# Patient Record
Sex: MALE | Race: WHITE | NOT HISPANIC OR LATINO | Employment: FULL TIME | URBAN - METROPOLITAN AREA
[De-identification: names, ages, dates, MRNs, and addresses within clinical notes are randomized per-mention and may not be internally consistent; named-entity substitution may affect disease eponyms.]

---

## 2024-01-05 PROBLEM — M75.82 TENDINITIS OF LEFT ROTATOR CUFF: Status: ACTIVE | Noted: 2024-01-05

## 2024-01-05 PROBLEM — H61.23 BILATERAL IMPACTED CERUMEN: Status: ACTIVE | Noted: 2024-01-05

## 2024-01-05 PROBLEM — H92.13 OTORRHEA OF BOTH EARS: Status: ACTIVE | Noted: 2024-01-05

## 2024-01-05 PROBLEM — R63.4 WEIGHT LOSS: Status: ACTIVE | Noted: 2024-01-05

## 2024-01-05 PROBLEM — J20.9 ACUTE BRONCHITIS: Status: ACTIVE | Noted: 2024-01-05

## 2024-01-05 PROBLEM — H60.8X3 CHRONIC ECZEMATOUS OTITIS EXTERNA OF BOTH EARS: Status: ACTIVE | Noted: 2024-01-05

## 2024-01-05 PROBLEM — H90.3 BILATERAL HIGH FREQUENCY SENSORINEURAL HEARING LOSS: Status: ACTIVE | Noted: 2024-01-05

## 2024-01-05 PROBLEM — M54.16 LUMBAR RADICULOPATHY: Status: ACTIVE | Noted: 2024-01-05

## 2024-01-05 PROBLEM — R74.8 ELEVATED LIVER ENZYMES: Status: ACTIVE | Noted: 2024-01-05

## 2024-01-05 PROBLEM — K40.90 RIGHT INGUINAL HERNIA: Status: ACTIVE | Noted: 2024-01-05

## 2024-01-05 PROBLEM — T16.9XXA FOREIGN BODY IN EAR: Status: ACTIVE | Noted: 2024-01-05

## 2024-01-05 PROBLEM — R07.9 CHEST PAIN: Status: ACTIVE | Noted: 2024-01-05

## 2024-01-05 PROBLEM — M54.50 ACUTE LOW BACK PAIN: Status: ACTIVE | Noted: 2024-01-05

## 2024-01-05 RX ORDER — FLUOCINOLONE ACETONIDE 0.11 MG/ML
OIL AURICULAR (OTIC)
COMMUNITY
Start: 2021-11-15 | End: 2024-01-15 | Stop reason: ALTCHOICE

## 2024-01-05 RX ORDER — CLOTRIMAZOLE AND BETAMETHASONE DIPROPIONATE 10; .64 MG/G; MG/G
CREAM TOPICAL
COMMUNITY
Start: 2021-05-07 | End: 2024-01-15 | Stop reason: SDUPTHER

## 2024-01-05 RX ORDER — HYDROCORTISONE AND ACETIC ACID 20.75; 10.375 MG/ML; MG/ML
SOLUTION AURICULAR (OTIC)
COMMUNITY
Start: 2021-05-07

## 2024-01-12 NOTE — PROGRESS NOTES
Subjective   Patient ID: Favian Boykin is a 53 y.o. male who presents for Follow-up and Hearing Loss.  HPI  This 53-year-old male is being seen back in the office for a recheck of his head and neck and hearing.  He was treated previously for otitis media with effusion which cleared with medical management.  He does have a history of chronic eczematous changes to both ear canals.  He does have a history of occupational noise exposure and previous audiograms when no middle ear fluid was present was primarily positive for mild high-frequency hearing loss.  Has had difficulties with eczematous changes to both ear canals and had been on fluocinolone oil drops and Lotrisone ointment as treatment when needed.  Review of Systems  A 12 point ROS has been reviewed and are negative for complaint except what is stated in the history of present illness and/or past medical history as noted in the EMR  Objective   Physical Exam  EXAMINATION:    GENERAL HARRY.EARANCE: Alert, in no acute distress, normal pitch and clarity of voice, well-developed and nourished, cooperative.    HEAD/FACE: Normocephalic, atraumatic, normal facial movements and strength, no no tenderness to palpation, no lesions noted.    SKIN: Normal turgor, no raised or ulcerative lesions, warm and dry to palpation.    EYES: Extraocular motions intact, no nystagmus noted, pupils equal and reactive to light and accommodation, no conjunctivitis.    EARS: Both ears--external ear anatomy is normal without lesions, auditory canals are patent and without skin abrasions or lesions, cerumen partially obstructs both ear canals and is removed with a wax loop, hearing is intact to voice, tympanic membranes are intact with no acute inflammation, light reflexes present, no effusions are noted and no mastoid tenderness found to palpation.    NOSE:  No external skin lesions are noted, nares are patent, septum is intact with mild anterior deviation to the right   , dust and matter from  his work environment as noted on the mucous membrane surfaces, nasal turbinates are normal in appearance, sinuses are nontender to palpation bilaterally, no internal lesions or polyps are noted, no discharge is noted.     OROPHARYNX/ORAL CAVITY: Oropharynx is not inflamed and is without lesions, mucosa of the oral cavity is intact and without lesions, tongue is midline and mobile, no acute dental disease is noted, TMJs are mobile    LUNG-- NO wheezing or rhonchi normal respiratory effort    HEART-- No venous congestion,  rate and rhythm regular,    NECK: No lymphadenopathy is palpated, carotid pulses are intact, neck is supple with full range of motion, no thyroid abnormalities are noted, trachea is midline, no neck masses are palpated.    LYMPHATICS: No cervical adenopathy or supraclavicular adenopathy is palpated.    NEUROLOGIC/PSYCH; alert and oriented, cranial nerves are grossly intact, gait is without falling, no motor deficits are noted.    Audiogram today on the right ear revealed mild hearing loss in the upper frequencies borderline normal and remaining frequencies.  On the left-hand side there was a mixed hearing loss mild up through 4000 Hz moderate in the 2 upper frequencies of sound similar to his test from 2022.  Tympanogram revealed a normal pattern on the right type C pattern on the left.  His exam was negative for visible middle ear fluid.    Patient ID: Favian Boykin is a 53 y.o. male.    Ear cerumen removal    Date/Time: 1/15/2024 3:46 PM    Performed by: Abdulkadir Echevarria DMD, MD  Authorized by: Abdulkadir Echevarria DMD, MD    Consent:     Consent obtained:  Verbal    Consent given by:  Patient    Risks discussed:  Pain and incomplete removal    Alternatives discussed:  No treatment and alternative treatment  Universal protocol:     Procedure explained and questions answered to patient or proxy's satisfaction: yes      Imaging studies available: no      Required blood products, implants, devices, and  special equipment available: no      Patient identity confirmed:  Verbally with patient  Procedure details:     Location:  L ear and R ear    Procedure type: curette      Procedure type comment:  Or suction    Procedure outcomes: cerumen removed    Post-procedure details:     Inspection:  No bleeding and ear canal clear    Hearing quality:  Improved    Procedure completion:  Tolerated well, no immediate complications  Comments:        Procedure Ear Cleaning    Consent:  The planned procedure including the risks as well as alternatives of treatments were discussed and verbal consent obtained.    Procedure: Using otoscopic techniques, cerumen is removed with a wax loop from both ear canals.    Findings:  The external auditory canals are without inflammation or lesions and both tympanic members are normal in appearance with no evidence for middle ear effusion or signs of infection. No mastoid tenderness is noted. The patient tolerated the procedure well.    Assessment/Plan   Problem List Items Addressed This Visit             ICD-10-CM    Bilateral high frequency sensorineural hearing loss - Primary H90.3    Bilateral impacted cerumen H61.23    Chronic eczematous otitis externa of both ears H60.8X3     Other Visit Diagnoses         Codes    Nasal septal deviation     J34.2    Hypertrophy of nasal turbinates     J34.3          I discussed the clinical finds the patient.  It does appear that he is still having some difficulties with eczematous changes to the ear canal skin and the use of the fluocinolone oil drops and Lotrisone ointment will continue using them as needed for breakthrough symptoms.  The fluocinolone oil drops he should use 2 days/week on average.  I encouraged him to keep water out of his ear and to avoid Q-tip use.  If there is pain, drainage or sudden change in hearing needs to contact the office.  From the standpoint of his nasal airway he does have a deviation of the septum towards the left along with  turbinate hypertrophy and this can be influencing his eustachian tube findings.  His hearing test from 2021 on the left was better than his present levels reflect reflective of some conductive elements influenced by air pressure changes.  There did not appear to be any middle ear fluid and oral steroids were not recommended.  He I did talk to him about the use of topical nasal steroid sprays which may be of some benefit.  He should protect his hears from loud noise during his workday and if there is any sudden change in hearing he needs to be seen to see if this is mechanical or a sensorineural change.  Otherwise a recheck in 1 year is recommended.       Abdulkadir Echevarria DMD, MD 01/15/24 4:23 PM

## 2024-01-15 ENCOUNTER — CLINICAL SUPPORT (OUTPATIENT)
Dept: AUDIOLOGY | Facility: CLINIC | Age: 54
End: 2024-01-15
Payer: COMMERCIAL

## 2024-01-15 ENCOUNTER — OFFICE VISIT (OUTPATIENT)
Dept: OTOLARYNGOLOGY | Facility: CLINIC | Age: 54
End: 2024-01-15
Payer: COMMERCIAL

## 2024-01-15 VITALS — WEIGHT: 225 LBS | BODY MASS INDEX: 34.1 KG/M2 | HEIGHT: 68 IN

## 2024-01-15 DIAGNOSIS — H90.3 BILATERAL HIGH FREQUENCY SENSORINEURAL HEARING LOSS: Primary | ICD-10-CM

## 2024-01-15 DIAGNOSIS — J34.3 HYPERTROPHY OF NASAL TURBINATES: ICD-10-CM

## 2024-01-15 DIAGNOSIS — H90.A32 MIXED CONDUCTIVE AND SENSORINEURAL HEARING LOSS OF LEFT EAR WITH RESTRICTED HEARING OF RIGHT EAR: Primary | ICD-10-CM

## 2024-01-15 DIAGNOSIS — J34.2 NASAL SEPTAL DEVIATION: ICD-10-CM

## 2024-01-15 DIAGNOSIS — H90.A21 SENSORINEURAL HEARING LOSS (SNHL) OF RIGHT EAR WITH RESTRICTED HEARING OF LEFT EAR: ICD-10-CM

## 2024-01-15 DIAGNOSIS — H61.23 BILATERAL IMPACTED CERUMEN: ICD-10-CM

## 2024-01-15 DIAGNOSIS — H60.8X3 CHRONIC ECZEMATOUS OTITIS EXTERNA OF BOTH EARS: ICD-10-CM

## 2024-01-15 DIAGNOSIS — H69.92 DYSFUNCTION OF LEFT EUSTACHIAN TUBE: ICD-10-CM

## 2024-01-15 PROBLEM — R26.2 DIFFICULTY WALKING: Status: ACTIVE | Noted: 2020-11-12

## 2024-01-15 PROBLEM — E78.00 HIGH CHOLESTEROL: Status: ACTIVE | Noted: 2023-02-09

## 2024-01-15 PROBLEM — E66.9 OBESITY, CLASS I, BMI 30-34.9: Status: ACTIVE | Noted: 2020-08-31

## 2024-01-15 PROBLEM — E66.811 OBESITY, CLASS I, BMI 30-34.9: Status: ACTIVE | Noted: 2020-08-31

## 2024-01-15 PROCEDURE — 92557 COMPREHENSIVE HEARING TEST: CPT | Performed by: AUDIOLOGIST

## 2024-01-15 PROCEDURE — 92567 TYMPANOMETRY: CPT | Performed by: AUDIOLOGIST

## 2024-01-15 PROCEDURE — 69210 REMOVE IMPACTED EAR WAX UNI: CPT | Performed by: OTOLARYNGOLOGY

## 2024-01-15 PROCEDURE — 1036F TOBACCO NON-USER: CPT | Performed by: OTOLARYNGOLOGY

## 2024-01-15 PROCEDURE — 99214 OFFICE O/P EST MOD 30 MIN: CPT | Performed by: OTOLARYNGOLOGY

## 2024-01-15 RX ORDER — ATORVASTATIN CALCIUM 20 MG/1
20 TABLET, FILM COATED ORAL DAILY
COMMUNITY

## 2024-01-15 RX ORDER — AMLODIPINE BESYLATE 5 MG/1
5 TABLET ORAL DAILY
COMMUNITY

## 2024-01-15 RX ORDER — HYDROCHLOROTHIAZIDE 25 MG/1
25 TABLET ORAL
COMMUNITY

## 2024-01-15 RX ORDER — CLOTRIMAZOLE AND BETAMETHASONE DIPROPIONATE 10; .64 MG/G; MG/G
1 CREAM TOPICAL 2 TIMES DAILY
Qty: 45 G | Refills: 1 | Status: SHIPPED | OUTPATIENT
Start: 2024-01-15 | End: 2024-01-29

## 2024-01-15 RX ORDER — METOPROLOL TARTRATE 100 MG/1
TABLET ORAL
COMMUNITY

## 2024-01-15 RX ORDER — FLUOCINOLONE ACETONIDE 0.11 MG/ML
OIL AURICULAR (OTIC)
Qty: 20 ML | Refills: 2 | Status: SHIPPED | OUTPATIENT
Start: 2024-01-15

## 2024-01-15 RX ORDER — LOSARTAN POTASSIUM AND HYDROCHLOROTHIAZIDE 25; 100 MG/1; MG/1
1 TABLET ORAL DAILY
COMMUNITY

## 2024-01-15 NOTE — PROGRESS NOTES
COMPREHENSIVE AUDIOMETRIC EVALUATION      Name:  Favian Boykin  :  1970  Age:  53 y.o.  Date of Evaluation:  01/15/24   Referring Provider:  Dr. Echevarria     History:  Mr. Boykin was seen today  for an evaluation of hearing.  Patient reported history of noise exposure without utilization of hearing protection.  When asked, patient denied otalgia, aural fullness, tinnitus, concerns for decreased hearing sensitivity, medical history significant for diabetes, and treatment by chemotherapy or radiation.    See audiometric evaluation at end of this report or scanned under media tab    OTOSCOPY:       Right Ear: Clear canal       Left Ear: Clear canal    226 Hz TYMPANOMETRY:       Right Ear: Type Ad: hypercompliant with normal peak pressure and ear canal volume       Left Ear: Type C: Negative pressure with normal compliance and ear canal volume, consistent with eustachian tube dysfunction    AUDIOMETRIC EVALUATION (Phones):       Right Ear: Normal sloping to Mild Sensorineural hearing loss                 Left Ear: Mild sloping to Moderately severe Mixed hearing loss          NOTE: Hearing sensitivity consistent with 2022     Test technique:  Standard Audiometry  Reliability:   good    SPEECH RECOGNITION THRESHOLD:       Right Ear:  20 dBHL in good agreement with PTA       Left Ear:  30 dBHL in good agreement with PTA    WORD RECOGNITION:       Right Ear:  excellent (100%) at normal presentation level       Left Ear:  excellent (100%) at elevated presentation level    DISCUSSION:   Discussed results and recommendations with patient.  Questions were addressed and the patient was encouraged to contact our department should concerns arise.    RECOMMENDATIONS:  -Recommend patient return following any medical management for repeated evaluation of hearing sensitivity and tympanometry  -Discussed important of consistent utilization of hearing protection in environments with high intensity sounds.  Recommend patient  establish consistent utilization of hearing protection.    Yoana Parker, CCC-A     Appt: 4:00 - 4:30 PM

## 2025-01-16 NOTE — PROGRESS NOTES
Subjective   Patient ID: Favian Boykin is a 54 y.o. male who presents for No chief complaint on file..  HPI  This 54-year-old male is being seen today for a yearly recheck of his ears and hearing.  He has had a history of eczematous changes to the ear canal skin which has been treated periodically with fluocinolone oil drops and Lotrisone ointment externally.  He also has been treated in the past for otitis media effusion which medically cleared.  He does have a history of occupational noise exposure with this findings of high-frequency hearing loss.  Review of Systems   A 12 point ROS  has been reviewed and are negative for complaint except for what is stated in the history of present illness and /or for past medical history as noted in the EMR.    Past Medical History:   Diagnosis Date    Personal history of other endocrine, nutritional and metabolic disease     History of high cholesterol          Current Outpatient Medications:     amLODIPine (Norvasc) 5 mg tablet, Take 1 tablet (5 mg) by mouth once daily., Disp: , Rfl:     atorvastatin (Lipitor) 20 mg tablet, Take 1 tablet (20 mg) by mouth once daily., Disp: , Rfl:     fluocinolone (DermOtic) 0.01 % ear drops, 2 to 3 drops in the ear canal for dryness and irritation 2 days/week, Disp: 20 mL, Rfl: 2    hydroCHLOROthiazide (HYDRODiuril) 25 mg tablet, Take 1 tablet (25 mg) by mouth once daily in the morning. Take before meals., Disp: , Rfl:     hydrocortisone-acetic acid (Vosol-HC) otic solution, Administer into affected ear(s)., Disp: , Rfl:     losartan-hydrochlorothiazide (Hyzaar) 100-25 mg tablet, Take 1 tablet by mouth once daily., Disp: , Rfl:     metoprolol tartrate (Lopressor) 100 mg tablet, take 1 tablet by mouth 2 hours before the scan.  bring 1 tablet with you., Disp: , Rfl:      Social History     Tobacco Use    Smoking status: Former     Current packs/day: 0.00     Types: Cigarettes     Quit date:      Years since quittin.0     Passive exposure:  "Past    Smokeless tobacco: Never   Substance Use Topics    Alcohol use: Yes     Alcohol/week: 10.0 standard drinks of alcohol     Types: 10 Cans of beer per week       No Known Allergies    Height 1.727 m (5' 8\"), weight 103 kg (228 lb).    Objective   Physical Exam  EXAMINATION:     GENERAL HARRY.EARANCE: Alert, in no acute distress, normal pitch and clarity of voice, well-developed and nourished, cooperative.     HEAD/FACE: Normocephalic, atraumatic, normal facial movements and strength, no no tenderness to palpation, no lesions noted.     SKIN: Normal turgor, no raised or ulcerative lesions, warm and dry to palpation.     EYES: Extraocular motions intact, no nystagmus noted, pupils equal and reactive to light and accommodation, no conjunctivitis.     EARS: Both ears--external ear anatomy is normal without lesions, auditory canals are patent and without skin abrasions or lesions, cerumen partially obstructs both ear canals and is removed with a wax loop, hearing is intact to voice, tympanic membranes are intact with no acute inflammation, light reflexes present, no effusions are noted and no mastoid tenderness found to palpation.     NOSE:  No external skin lesions are noted, nares are patent, septum is intact with mild anterior deviation to the right   , dust and matter from his work environment as noted on the mucous membrane surfaces, nasal turbinates are normal in appearance, sinuses are nontender to palpation bilaterally, no internal lesions or polyps are noted, no discharge is noted.      OROPHARYNX/ORAL CAVITY: Oropharynx is not inflamed and is without lesions, mucosa of the oral cavity is intact and without lesions, tongue is midline and mobile, no acute dental disease is noted, TMJs are mobile     LUNG-- NO wheezing or rhonchi normal respiratory effort     HEART-- No venous congestion,  rate and rhythm regular,     NECK: No lymphadenopathy is palpated, carotid pulses are intact, neck is supple with full " range of motion, no thyroid abnormalities are noted, trachea is midline, no neck masses are palpated.     LYMPHATICS: No cervical adenopathy or supraclavicular adenopathy is palpated.     NEUROLOGIC/PSYCH; alert and oriented, cranial nerves are grossly intact, gait is without falling, no motor deficits are noted.      His audiogram today revealed normal hearing in the right ear up through 2000 Hz in the left ear 1000 Hz.  Mild hearing loss is noted in the remaining frequencies.  Discrimination scores 96% on the right at 45 dB hypes on the left at 50 dB.  Some improvement on the left due to resolution of the previous conductive loss.  Tympanograms were normal patient ID: Favian Boykin is a 54 y.o. male.    Ear cerumen removal    Date/Time: 1/20/2025 3:05 PM    Performed by: Abdulkadir Echevarria DMD, MD  Authorized by: Abdulkadir Echevarria DMD, MD    Consent:     Consent obtained:  Verbal    Consent given by:  Patient    Risks discussed:  Pain and incomplete removal    Alternatives discussed:  No treatment and alternative treatment  Universal protocol:     Procedure explained and questions answered to patient or proxy's satisfaction: yes      Imaging studies available: no      Required blood products, implants, devices, and special equipment available: no      Patient identity confirmed:  Verbally with patient  Procedure details:     Location:  L ear and R ear    Procedure type: curette      Procedure type comment:  Or suction    Procedure outcomes: cerumen removed    Post-procedure details:     Inspection:  No bleeding and ear canal clear    Hearing quality:  Improved    Procedure completion:  Tolerated well, no immediate complications  Comments:        Procedure Ear Cleaning    Consent:  The planned procedure including the risks as well as alternatives of treatments were discussed and verbal consent obtained.    Procedure: Using otoscopic techniques, cerumen is removed with a wax loop from both ear canals.    Findings:  The  external auditory canals are without inflammation or lesions and both tympanic members are normal in appearance with no evidence for middle ear effusion or signs of infection. No mastoid tenderness is noted. The patient tolerated the procedure well.       Assessment/Plan   Problem List Items Addressed This Visit             ICD-10-CM    Bilateral high frequency sensorineural hearing loss - Primary H90.3    Bilateral impacted cerumen H61.23    Chronic eczematous otitis externa of both ears H60.8X3     Other Visit Diagnoses         Codes    Nasal septal deviation     J34.2    Hypertrophy of nasal turbinates     J34.3          I discussed the present hearing test findings with the patient. Since the last test there has been improvement in the hearing on the left-hand side with previous conductive loss.  Mild hearing loss is noted bilaterally in the mid to high frequencies with normal speech audiometry.  Discrimination ability remains basically unchanged. It would be advised that a yearly audiogram be done unless symptoms develop in regards to progressive loss, new onset vertigo, or changes regarding tinnitus. Avoidance of loud noise without ear protection is advised.      I discussed the findings with the patient. Do to the history of cerumen impactions, avoidance of Q tip use and water contamination is advised. Periodic check ups in the office or with the PCP are advised and if recurrent obstructions are noted a scheduled cleaning schedule will be maintained. Ear pain, otorhea, changes in hearing should be reported to the office. For some the use of debrox or baby oil can be helpful as long as ttheTM is intact and not perforated.       Abdulkadir Echevarria DMD, MD 01/16/25 2:10 PM

## 2025-01-20 ENCOUNTER — APPOINTMENT (OUTPATIENT)
Dept: AUDIOLOGY | Facility: CLINIC | Age: 55
End: 2025-01-20
Payer: COMMERCIAL

## 2025-01-20 ENCOUNTER — APPOINTMENT (OUTPATIENT)
Dept: OTOLARYNGOLOGY | Facility: CLINIC | Age: 55
End: 2025-01-20
Payer: COMMERCIAL

## 2025-01-20 VITALS — BODY MASS INDEX: 34.56 KG/M2 | HEIGHT: 68 IN | WEIGHT: 228 LBS

## 2025-01-20 DIAGNOSIS — H90.3 SENSORINEURAL HEARING LOSS (SNHL) OF BOTH EARS: Primary | ICD-10-CM

## 2025-01-20 DIAGNOSIS — H60.8X3 CHRONIC ECZEMATOUS OTITIS EXTERNA OF BOTH EARS: ICD-10-CM

## 2025-01-20 DIAGNOSIS — J34.3 HYPERTROPHY OF NASAL TURBINATES: ICD-10-CM

## 2025-01-20 DIAGNOSIS — H61.23 BILATERAL IMPACTED CERUMEN: ICD-10-CM

## 2025-01-20 DIAGNOSIS — H90.3 BILATERAL HIGH FREQUENCY SENSORINEURAL HEARING LOSS: Primary | ICD-10-CM

## 2025-01-20 DIAGNOSIS — J34.2 NASAL SEPTAL DEVIATION: ICD-10-CM

## 2025-01-20 DIAGNOSIS — H61.23 EXCESSIVE CERUMEN IN EAR CANAL, BILATERAL: ICD-10-CM

## 2025-01-20 PROCEDURE — 1036F TOBACCO NON-USER: CPT | Performed by: OTOLARYNGOLOGY

## 2025-01-20 PROCEDURE — 3008F BODY MASS INDEX DOCD: CPT | Performed by: OTOLARYNGOLOGY

## 2025-01-20 PROCEDURE — 92567 TYMPANOMETRY: CPT | Performed by: AUDIOLOGIST

## 2025-01-20 PROCEDURE — 92557 COMPREHENSIVE HEARING TEST: CPT | Performed by: AUDIOLOGIST

## 2025-01-20 PROCEDURE — 99214 OFFICE O/P EST MOD 30 MIN: CPT | Performed by: OTOLARYNGOLOGY

## 2025-01-20 PROCEDURE — 69210 REMOVE IMPACTED EAR WAX UNI: CPT | Performed by: OTOLARYNGOLOGY

## 2025-01-20 NOTE — PROGRESS NOTES
[facility]UD[facility]  AUDIOMETRIC EVALUATION      Name:  Favian Boykin   :  1970  Age:  54 y.o.  Date of Evaluation:  25    HISTORY    Favian Boykin is seen today at the request of Abdulkadir Echevarria M.D., MARIA ANTONIA., F.A.C.S.  The patient is an established patient monitoring hearing loss progression.    EVALUATION  See scanned audiogram in Media and included at the end of this report.    RESULTS    Otoscopic Evaluation:  Right Ear:  mostly clear   Left Ear:  excessive but non-occluding cerumen    Tympanometry:   Right Ear:  Type Ad, consistent with excessive eardrum mobility   Left Ear:  Type Ad, consistent with excessive eardrum mobility    Acoustic reflexes were not completed    Pure Tone Audiometry:    Right Ear:  normal to mild sensorineural hearing loss  Left Ear:  normal to mild sensorineural hearing loss       Speech Audiometry:    Right Ear:  excellent in quiet at a normal presentation level  Left Ear:  excellent in quiet at a normal presentation level  Speech reception thresholds were in good agreement with pure tone testing.    DISCUSSION  Results were relayed to Abdulkadir Echevarria M.D., MARIA ANTONIA., F.A.C.S.    APPOINTMENT TIME  2:30pm-3:00pm     Nick Reyes  Doctor of Audiology  Senior Audiologist

## 2026-01-21 ENCOUNTER — APPOINTMENT (OUTPATIENT)
Dept: OTOLARYNGOLOGY | Facility: CLINIC | Age: 56
End: 2026-01-21
Payer: COMMERCIAL